# Patient Record
Sex: FEMALE | Race: WHITE | URBAN - METROPOLITAN AREA
[De-identification: names, ages, dates, MRNs, and addresses within clinical notes are randomized per-mention and may not be internally consistent; named-entity substitution may affect disease eponyms.]

---
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
## 2022-11-15 ENCOUNTER — HOSPITAL ENCOUNTER (EMERGENCY)
Age: 22
Discharge: HOME | End: 2022-11-15
Payer: COMMERCIAL

## 2022-11-15 VITALS
BODY MASS INDEX: 38.98 KG/M2 | RESPIRATION RATE: 18 BRPM | HEART RATE: 112 BPM | OXYGEN SATURATION: 100 % | HEIGHT: 63 IN | WEIGHT: 220 LBS | TEMPERATURE: 98.1 F | DIASTOLIC BLOOD PRESSURE: 84 MMHG | SYSTOLIC BLOOD PRESSURE: 115 MMHG

## 2022-11-15 DIAGNOSIS — J03.00 STREPTOCOCCAL TONSILLITIS: Primary | ICD-10-CM

## 2022-11-15 LAB
RAPID INFLUENZA  B AGN: NEGATIVE
RAPID INFLUENZA A AGN: NEGATIVE
S PYO AG THROAT QL: POSITIVE

## 2022-11-15 PROCEDURE — 87880 STREP A ASSAY W/OPTIC: CPT

## 2022-11-15 PROCEDURE — 99283 EMERGENCY DEPT VISIT LOW MDM: CPT

## 2022-11-15 PROCEDURE — 6370000000 HC RX 637 (ALT 250 FOR IP): Performed by: PHYSICIAN ASSISTANT

## 2022-11-15 PROCEDURE — 6360000002 HC RX W HCPCS: Performed by: PHYSICIAN ASSISTANT

## 2022-11-15 PROCEDURE — 87804 INFLUENZA ASSAY W/OPTIC: CPT

## 2022-11-15 RX ORDER — AMOXICILLIN AND CLAVULANATE POTASSIUM 875; 125 MG/1; MG/1
1 TABLET, FILM COATED ORAL ONCE
Status: COMPLETED | OUTPATIENT
Start: 2022-11-15 | End: 2022-11-15

## 2022-11-15 RX ORDER — AMOXICILLIN AND CLAVULANATE POTASSIUM 875; 125 MG/1; MG/1
1 TABLET, FILM COATED ORAL 2 TIMES DAILY
Qty: 20 TABLET | Refills: 0 | Status: SHIPPED | OUTPATIENT
Start: 2022-11-15 | End: 2022-11-25

## 2022-11-15 RX ORDER — DEXAMETHASONE SODIUM PHOSPHATE 10 MG/ML
10 INJECTION, SOLUTION INTRAMUSCULAR; INTRAVENOUS ONCE
Status: COMPLETED | OUTPATIENT
Start: 2022-11-15 | End: 2022-11-15

## 2022-11-15 RX ORDER — ACETAMINOPHEN 325 MG/1
650 TABLET ORAL ONCE
Status: COMPLETED | OUTPATIENT
Start: 2022-11-15 | End: 2022-11-15

## 2022-11-15 RX ORDER — PREDNISONE 20 MG/1
40 TABLET ORAL DAILY
Qty: 10 TABLET | Refills: 0 | Status: SHIPPED | OUTPATIENT
Start: 2022-11-15 | End: 2022-11-20

## 2022-11-15 RX ADMIN — ACETAMINOPHEN 650 MG: 325 TABLET ORAL at 14:07

## 2022-11-15 RX ADMIN — DEXAMETHASONE SODIUM PHOSPHATE 10 MG: 10 INJECTION, SOLUTION INTRAMUSCULAR; INTRAVENOUS at 14:07

## 2022-11-15 RX ADMIN — AMOXICILLIN AND CLAVULANATE POTASSIUM 1 TABLET: 875; 125 TABLET, FILM COATED ORAL at 14:07

## 2022-11-15 ASSESSMENT — ENCOUNTER SYMPTOMS
SHORTNESS OF BREATH: 0
COUGH: 0
SORE THROAT: 1
VOMITING: 0
VOICE CHANGE: 0

## 2022-11-15 ASSESSMENT — PAIN SCALES - GENERAL: PAINLEVEL_OUTOF10: 2

## 2022-11-15 ASSESSMENT — PAIN DESCRIPTION - LOCATION: LOCATION: THROAT

## 2022-11-15 ASSESSMENT — PAIN DESCRIPTION - DESCRIPTORS: DESCRIPTORS: SORE

## 2022-11-15 ASSESSMENT — PAIN - FUNCTIONAL ASSESSMENT: PAIN_FUNCTIONAL_ASSESSMENT: 0-10

## 2022-11-15 ASSESSMENT — PAIN DESCRIPTION - PAIN TYPE: TYPE: ACUTE PAIN

## 2022-11-15 NOTE — ED NOTES
Discharge instructions and medications reviewed with patient. Patient verbalized understanding of medications and follow up. All questions answered at this time. Skin warm, pink, and dry. Patient alert and oriented x4. Pt ambulated to lobby with a stable gait. Patient discharged home with 2 prescriptions.        Ryan Hurt RN  11/15/22 2585

## 2022-11-15 NOTE — ED NOTES
Pt provided with two cups of orange juice, and 2 large cups of ice water and directed to drink as much as she could. Will reassess HR following oral intake.       Lyle Louis RN  11/15/22 3196

## 2022-11-15 NOTE — ED PROVIDER NOTES
[address] [facility]        Pt Name: Joshua Henderson  MRN: [id number]  [place] 2000  Date of evaluation: 11/15/2022  Provider: Jennifer Olivera PA-C  PCP: No primary care provider on file. Shared Visit or Autonomous Visit: MELQUIADES. I have evaluated this patient. My supervising physician was available for consultation. CHIEF COMPLAINT       Chief Complaint   Patient presents with    Pharyngitis     Pt reports her throat is swelling and tonsils are sore, believes she has a fever. States yesterday is when throat got \"really bad\". HISTORY OF PRESENT ILLNESS   (Location/Symptom, Timing/Onset, Context/Setting, Quality, Duration, Modifying Factors, Severity)  Note limiting factors. Joshua Henderson is a 23 y.o. female presenting to the emergency department for evaluation of sore throat that started 2 days ago and worse today. History of enlarged tonsils, frequent strep tonsillitis, states has seen ENT about getting her tonsils removed ended up having to cancel surgery due to other circumstances but plans to follow-up for this. She is able to drink fluids but has pain with swallowing due to large tonsils. Has felt feverish. No cough. No vomiting. No rash. The history is provided by the patient. Pharyngitis  Location:  Generalized  Quality:  Sore  Onset quality:  Gradual  Duration:  2 days  Timing:  Constant  Progression:  Worsening  Chronicity:  Recurrent  Associated symptoms: adenopathy and chills    Associated symptoms: no chest pain, no cough, no drooling, no ear pain, no fever, no rash, no shortness of breath and no voice change      Nursing Notes were reviewed    REVIEW OF SYSTEMS    (2-9 systems for level 4, 10 or more for level 5)     Review of Systems   Constitutional:  Positive for chills. Negative for fever. HENT:  Positive for sore throat. Negative for drooling, ear pain and voice change.     Respiratory:  Negative for cough and shortness of breath. Cardiovascular:  Negative for chest pain. Gastrointestinal:  Negative for vomiting. Skin:  Negative for rash. Hematological:  Positive for adenopathy. Positives and Pertinent negatives as per HPI. PAST MEDICAL HISTORY     Past Medical History:   Diagnosis Date    Mental disorder     Depression- Not currently         SURGICAL HISTORY     Past Surgical History:   Procedure Laterality Date     SECTION N/A 3/18/2020     SECTION performed by Rosa Collado DO at [facility] L[facility] [address]       Discharge Medication List as of 11/15/2022  2:55 PM            ALLERGIES     Patient has no known allergies. FAMILYHISTORY     History reviewed. No pertinent family history. SOCIAL HISTORY       Social History     Socioeconomic History    Marital status: Single     Spouse name: None    Number of children: None    Years of education: None    Highest education level: None   Tobacco Use    Smoking status: Never    Smokeless tobacco: Never   Vaping Use    Vaping Use: Never used   Substance and Sexual Activity    Alcohol use: Not Currently    Drug use: Never    Sexual activity: Yes     Partners: Male       SCREENINGS    [place] Coma Scale  Eye Opening: Spontaneous  Best Verbal Response: Oriented  Best Motor Response: Obeys commands  [place] Coma Scale Score: 15        PHYSICAL EXAM    (up to 7 for level 4, 8 or more for level 5)     ED Triage Vitals [11/15/22 1300]   BP Temp Temp Source Heart Rate Resp SpO2 Height Weight   115/84 98.1 °F (36.7 °C) Oral (!) 120 18 100 % 5' 3\" (1.6 m) 220 lb (99.8 kg)       Physical Exam  Vitals and nursing note reviewed. Constitutional:       Appearance: She is well-developed. She is not toxic-appearing. HENT:      Head: Normocephalic and atraumatic. Right Ear: Tympanic membrane and ear canal normal. No drainage or swelling. Tympanic membrane is not erythematous.       Left Ear: Tympanic membrane and ear canal normal. No drainage or swelling. Tympanic membrane is not erythematous. Mouth/Throat:      Mouth: Mucous membranes are moist.      Pharynx: Oropharynx is clear. Uvula midline. Posterior oropharyngeal erythema present. No oropharyngeal exudate or uvula swelling. Tonsils: No tonsillar exudate or tonsillar abscesses. 4+ on the right. 4+ on the left. Comments: Tonsils are enlarged bilaterally nearly touching, symmetric, no abscess, uvula is midline, no deviation, voice is normal, holding own secretions, no drooling, no apparent difficulty swallowing during exam and no difficulty breathing. Eyes:      Conjunctiva/sclera: Conjunctivae normal.   Cardiovascular:      Rate and Rhythm: Regular rhythm. Tachycardia present. Heart sounds: Normal heart sounds. Pulmonary:      Effort: Pulmonary effort is normal. No respiratory distress. Breath sounds: Normal breath sounds. No stridor. No wheezing, rhonchi or rales. Musculoskeletal:      Cervical back: Normal range of motion and neck supple. No rigidity. Lymphadenopathy:      Head:      Right side of head: Tonsillar adenopathy present. Left side of head: Tonsillar adenopathy present. Skin:     General: Skin is warm and dry. Neurological:      Mental Status: She is alert and oriented to person, place, and time. GCS: GCS eye subscore is 4. GCS verbal subscore is 5. GCS motor subscore is 6. Motor: No abnormal muscle tone. Psychiatric:         Mood and Affect: Mood is anxious (and tearful). Behavior: Behavior is cooperative.        DIAGNOSTIC RESULTS   LABS:    Labs Reviewed   STREP SCREEN GROUP A THROAT - Abnormal; Notable for the following components:       Result Value    Rapid Strep A Screen POSITIVE (*)     All other components within normal limits   RAPID INFLUENZA A/B ANTIGENS       Results for orders placed or performed during the hospital encounter of 11/15/22   Strep screen group a throat    Specimen: Throat   Result Value Ref Range    Rapid Strep A Screen POSITIVE (A) Negative   Rapid influenza A/B antigens    Specimen: Nasopharyngeal   Result Value Ref Range    Rapid Influenza A Ag Negative Negative    Rapid Influenza B Ag Negative Negative       All other labs were not returned as of this dictation. EKG: All EKG's are interpreted by the Emergency Department Physician in the absence of a cardiologist.  Please see their note for interpretation of EKG. RADIOLOGY:   Non-plain film images such as CT, Ultrasound and MRI are read by the radiologist. Plain radiographic images are visualized andpreliminarily interpreted by the  ED Provider with the below findings:          Interpretation [facility] Radiologist below, if available at the time of this note:    No orders to display     No results found. PROCEDURES   Unless otherwise noted below, none     Procedures    CRITICAL CARE TIME   Critical care provided for this patient of which 0 min were spend on critical care and decision making. 0 min spent on procedures. There was imminent failure of an organ system which required critical intervention to prevent clinically significant progression of life threatening deterioration of the patient's condition to the point of disability or death.       CONSULTS:  None      EMERGENCY DEPARTMENT COURSE and DIFFERENTIAL DIAGNOSIS/MDM:   Vitals:    Vitals:    11/15/22 1300 11/15/22 1432   BP: 115/84    Pulse: (!) 120 (!) 112   Resp: 18    Temp: 98.1 °F (36.7 °C)    TempSrc: Oral    SpO2: 100%    Weight: 220 lb (99.8 kg)    Height: 5' 3\" (1.6 m)        Patient was given thefollowing medications:  Medications   amoxicillin-clavulanate (AUGMENTIN) 875-125 MG per tablet 1 tablet (1 tablet Oral Given 11/15/22 1407)   dexamethasone (PF) (DECADRON) injection 10 mg (10 mg Oral Given 11/15/22 1407)   acetaminophen (TYLENOL) tablet 650 mg (650 mg Oral Given 11/15/22 1407)       Is this patient to be included in the SEP-1 Core Measure due to severe sepsis or septic shock? No   Exclusion criteria - the patient is NOT to be included for SEP-1 Core Measure due to:  2+ SIRS criteria are not met       ED course  Patient presented to the ER for evaluation of sore throat. Has history of recurrent tonsillitis and with plans to get her tonsils removed. On exam she has 3-4+ tonsils bilaterally symmetric with erythema, no exudate, no abscess. Uvula is midline and no deviation. She has normal voice. She did state having pain with swallowing due to the swelling but she is able to swallow she is drinking fluids. She was initially tachycardic at 120 she appears anxious here and states has felt feverish we did give her Tylenol, started her on antibiotics and given oral Decadron and she was given 2 large cups of water which she has drink without any difficulty. Heart rate is improving is at 112 on recheck. She is overall well-appearing here. Not toxic or septic appearing. She feels comfortable going home. Suspect she may be a little dehydrated due to her decreased eating which is likely the cause of her tachycardia and we also discussed heart rate will go up with pain, fever and anxiety. She is appropriate for discharge home and close follow-up with her doctor started on Augmentin and prednisone. She agrees to return for any worsening symptoms states she just lives down the street. Specifically discussed returning for worsening pain, worsening swelling, inability to swallow fluids, fevers or vomiting. She understands and agrees. Provided referral to ENT. I estimate there is LOW risk for PNEUMONIA, MENINGITIS, PERITONSILLAR ABSCESS, SEPSIS, MALIGNANT OTITIS EXTERNA, EPIGLOTTITIS, ACUTE CORONARY SYNDROME OR PULMONARY EMBOLISM thus I consider the discharge disposition reasonable. FINAL IMPRESSION      1.  Streptococcal tonsillitis          DISPOSITION/PLAN   DISPOSITION Decision to Discharge    PATIENT REFERREDTO:  Jojo Oscar MD  [address] [place] [address] Dr Selina Spence [address] [facility]  [phone]    Call in 1 day  ENT, call for follow up appointment from ER visit    Nathalia Pilon    Call in 1 day  call for follow up appointment from ER visit    Katie Jackson.  [facility] Emergency Department  [address]  [phone]    If symptoms worsen      DISCHARGE MEDICATIONS:  Discharge Medication List as of 11/15/2022  2:55 PM        START taking these medications    Details   predniSONE (DELTASONE) 20 MG tablet Take 2 tablets by mouth daily for 5 days, Disp-10 tablet, R-0Normal      amoxicillin-clavulanate (AUGMENTIN) 875-125 MG per tablet Take 1 tablet by mouth 2 times daily for 10 days, Disp-20 tablet, R-0Normal             DISCONTINUED MEDICATIONS:  Discharge Medication List as of 11/15/2022  2:55 PM        STOP taking these medications       ibuprofen (ADVIL;MOTRIN) 800 MG tablet Comments:   Reason for Stopping:         ferrous sulfate (IRON 325) 325 (65 Fe) MG tablet Comments:   Reason for Stopping:         docusate sodium (COLACE, DULCOLAX) 100 MG CAPS Comments:   Reason for Stopping:         Prenatal Vit-Fe Fumarate-FA (PRENATAL VITAMIN) 27-0.8 MG TABS Comments:   Reason for Stopping:                      (Please note that portions ofthis note were completed with a voice recognition program.  Efforts were made to edit the dictations but occasionally words are mis-transcribed.)    Ellen Perrin PA-C (electronically signed)            April Arce PA-C  11/15/22 0419

## (undated) DEVICE — GARMENT COMPR L FOR 23IN CALF FLOTRN

## (undated) DEVICE — SOLUTION IV IRRIG POUR BRL 0.9% SODIUM CHL 2F7124

## (undated) DEVICE — SUTURE VCRL SZ 0 L36IN ABSRB UD L48MM CTX 1/2 CIR J978H

## (undated) DEVICE — DRESSING COMP IS W4XL10IN PD W2XL8IN CNTCT LAYR ADH

## (undated) DEVICE — SUTURE ABSORBABLE BRAIDED 2-0 CT-1 27 IN UD VICRYL J259H

## (undated) DEVICE — 3M™ STERI-STRIP™ COMPOUND BENZOIN TINCTURE 40 BAGS/CARTON 4 CARTONS/CASE C1544: Brand: 3M™ STERI-STRIP™

## (undated) DEVICE — SUTURE VCRL SZ 3-0 L36IN ABSRB UD L36MM CT-1 1/2 CIR J944H

## (undated) DEVICE — Device

## (undated) DEVICE — CHLORAPREP 26ML ORANGE

## (undated) DEVICE — PAD,NON-ADHERENT,3X8,STERILE,LF,1/PK: Brand: MEDLINE

## (undated) DEVICE — S/USE RESUS KIT W/O MASK (10): Brand: FISHER & PAYKEL HEALTHCARE

## (undated) DEVICE — SUTURE VCRL SZ 0 L36IN ABSRB UD L36MM CT-1 1/2 CIR J946H

## (undated) DEVICE — TRAY URIN CATH 16FR DRNGE BG STATLOK STBL DEV F SURSTP

## (undated) DEVICE — GLOVE SURG SZ 6 THK91MIL LTX FREE SYN POLYISOPRENE ANTI